# Patient Record
Sex: MALE | Race: ASIAN | Employment: PART TIME | URBAN - METROPOLITAN AREA
[De-identification: names, ages, dates, MRNs, and addresses within clinical notes are randomized per-mention and may not be internally consistent; named-entity substitution may affect disease eponyms.]

---

## 2022-10-05 ENCOUNTER — TELEPHONE (OUTPATIENT)
Dept: SLEEP CENTER | Age: 32
End: 2022-10-05

## 2022-10-08 ENCOUNTER — HOSPITAL ENCOUNTER (OUTPATIENT)
Dept: SLEEP CENTER | Age: 32
Discharge: HOME | End: 2022-10-08
Payer: COMMERCIAL

## 2022-10-08 DIAGNOSIS — G47.33 OSA (OBSTRUCTIVE SLEEP APNEA): Primary | ICD-10-CM

## 2022-10-08 PROCEDURE — 95810 POLYSOM 6/> YRS 4/> PARAM: CPT

## 2022-10-14 PROCEDURE — 95810 POLYSOM 6/> YRS 4/> PARAM: CPT | Performed by: INTERNAL MEDICINE

## 2022-10-14 NOTE — PROCEDURES
[address]       [address] [facility],  [address]                  [facility]              DIAGNOSTIC POLYSOMONGRAPHY SLEEP STUDY REPORT       PATIENT NAME: Teddy Najjar               : 1990        MED REC NO:  [id number]     ACCOUNT NO:   [de-identified]  PROVIDER:  Jurgen Cordoba DO  DATE OF STUDY: 10/8/22     SERVICE PROVIDER:  [facility]     REFERRING PROVIDER:   Dr. Izabel Gupta       AGE: 34 y.o.  yrs      SEX: male           HEIGHT: 5 ft 2 in         WEIGHT: 130 lbs          BMI: 23.8 kg/m2    NECK CIRCUMFERENCE: 14.5 in    Symptoms: EDS, snoring, naps, fall asleep driving, trouble at work due to sleepiness, witnessed apneas, wake confused, talk in sleep, grind teeth, trouble falling asleep, sleep paralysis. . The Slaughters Sleepiness Scale was 13 out of 24 (scores above or equal to 10 are suggestive of hypersomnolence). Indication: Evaluate for YASMIN. Medical History:  HTN, seizures, deviated septum, sinus problems. Medications: Medical marijuana, ciprofloxacin. DESCRIPTION: This full night polysomnogram consisted of EEG, EOG, EMG and 2-lead ECG monitoring. Oronasal airflow (nasal pressure transducer and thermistor), chest and abdominal efforts by respiratory inductance plethysmography or polyvinylidene fluoride (PVDF) sensor, and pulse oximetry were monitored as well. Hypopneas were scored as at least a 30% reduction in amplitude of the semi-quantitative flow signal, associated with a 4% or greater oxygen desaturation. Respiratory effort related arousals (RERAs) were scored as at least a 30% reduction in amplitude of the semi-quantitative flow signal, associated with an EEG microarousal.     FINDINGS:  SLEEP CONTINUITY AND SLEEP ARCHITECTURE:  Lights out at 11:05:54 PM and lights were turned on at 5:07:01 AM, for a total recording time (TRT) of 361.1 minutes.  The sleep period lasted 334.7 minutes and the total sleep time (TST) was 297.0 minutes, which resulted in a sleep efficiency (TST÷TRT) of 82.2%. The sleep latency (SL) was 26.4 minutes, and the latency to the first occurrence of Stage R was 80.0 minutes. There were 3 Stage R periods observed on this study night, 22 awakenings (i.e. transitions to Stage W from any sleep stage), and 78 total stage transitions. Wake after sleep onset (WASO) time accounted for 37.7 minutes, while the time spent is each sleep stage was 17.0 minutes (Stage N1); 151.5 minutes (Stage N2); 83.5 minutes (Stage N3); and 45.0 minutes (Stage R). The percentage of Total Sleep Time in each stage was: 5.7% (Stage N1); 51.0% (Stage N2); 28.1% (Stage N3); and 15.2% (Stage R). The microarousal index was normal at 9.7. RESPIRATORY MONITORING:  The patient experienced 0 apneas in total.  This resulted in an apnea index (AI) of 0.0 apneas/hour of sleep. The patient experienced 0 hypopneas in total, which resulted in a hypopnea index (HI) of 0.0 hypopneas/hr. The overall apnea-hypopnea index (AHI) was 0.0 events/hr, while the AHI during Stage R sleep was 0.0 /hr. AHI results by body-position showed: supine AHI = 0.0 /hr; right-side AHI = 0.0 /hr; left-side AHI = 0.0 /hr; and prone AHI = 0.0 /hr. For other respiratory disturbances, there were 0 occurrences of Cheyne Asencio breathing, and 4 respiratory effort-related arousals (RERAs). The RERA index was 0.8 events/hr, and the total respiratory disturbance index was 0.8 events/hr. Analysis of continuous oxygen saturations showed a mean SpO2 value of 94.4% throughout the study, with a minimum oxygen saturation during sleep of 89.0% and a mean value of 94.3% for the same period. Oxygen saturations were below ? 88% for 0.0 minutes of the time spent asleep. Soft snoring was noted. EEG: No abnormal epileptiform activity was observed. ECG: The electrocardiogram documented normal sinus rhythm.   The average heart rate during sleep was 66 beats per minute. PERIODIC LIMB MOVEMENTS: No periodic limb movements were noted. EMG/VIDEO MONITORING: Loss of REM atonia, bruxism, and parasomnias were not observed. IMPRESSION:   1. This study does not demonstrate evidence of obstructive sleep apnea  2. No parasomnias observed during the study. RECOMMENDATIONS:    1. There is no evidence of obstructive sleep apnea or periodic limb movement disorder to account for the patient's symptoms. Clinical correlation suggested.